# Patient Record
Sex: FEMALE | URBAN - METROPOLITAN AREA
[De-identification: names, ages, dates, MRNs, and addresses within clinical notes are randomized per-mention and may not be internally consistent; named-entity substitution may affect disease eponyms.]

---

## 2018-09-10 LAB
ABS NEUT (OLG): 7.39 X10(3)/MCL (ref 2.1–9.2)
ALBUMIN SERPL-MCNC: 4.4 GM/DL (ref 3.4–5)
ALBUMIN/GLOB SERPL: 1.4 {RATIO}
ALP SERPL-CCNC: 72 UNIT/L (ref 38–126)
ALT SERPL-CCNC: 15 UNIT/L (ref 12–78)
AST SERPL-CCNC: 9 UNIT/L (ref 15–37)
B-HCG SERPL QL: NEGATIVE
BASOPHILS # BLD AUTO: 0.1 X10(3)/MCL (ref 0–0.2)
BASOPHILS NFR BLD AUTO: 1 %
BILIRUB SERPL-MCNC: 0.3 MG/DL (ref 0.2–1)
BILIRUBIN DIRECT+TOT PNL SERPL-MCNC: 0.1 MG/DL (ref 0–0.2)
BILIRUBIN DIRECT+TOT PNL SERPL-MCNC: 0.2 MG/DL (ref 0–0.8)
BUN SERPL-MCNC: 19 MG/DL (ref 7–18)
CALCIUM SERPL-MCNC: 9 MG/DL (ref 8.5–10.1)
CHLORIDE SERPL-SCNC: 106 MMOL/L (ref 98–107)
CO2 SERPL-SCNC: 23 MMOL/L (ref 21–32)
CREAT SERPL-MCNC: 0.77 MG/DL (ref 0.55–1.02)
EOSINOPHIL # BLD AUTO: 0.2 X10(3)/MCL (ref 0–0.9)
EOSINOPHIL NFR BLD AUTO: 2 %
ERYTHROCYTE [DISTWIDTH] IN BLOOD BY AUTOMATED COUNT: 12.8 % (ref 11.5–17)
GLOBULIN SER-MCNC: 3.1 GM/DL (ref 2.4–3.5)
GLUCOSE SERPL-MCNC: 80 MG/DL (ref 74–106)
HCT VFR BLD AUTO: 41.4 % (ref 37–47)
HGB BLD-MCNC: 13.9 GM/DL (ref 12–16)
LYMPHOCYTES # BLD AUTO: 1.9 X10(3)/MCL (ref 0.6–4.6)
LYMPHOCYTES NFR BLD AUTO: 18 %
MCH RBC QN AUTO: 31.4 PG (ref 27–31)
MCHC RBC AUTO-ENTMCNC: 33.6 GM/DL (ref 33–36)
MCV RBC AUTO: 93.5 FL (ref 80–94)
MONOCYTES # BLD AUTO: 1 X10(3)/MCL (ref 0.1–1.3)
MONOCYTES NFR BLD AUTO: 9 %
NEUTROPHILS # BLD AUTO: 7.39 X10(3)/MCL (ref 1.4–7.9)
NEUTROPHILS NFR BLD AUTO: 70 %
PLATELET # BLD AUTO: 342 X10(3)/MCL (ref 130–400)
PMV BLD AUTO: 10.7 FL (ref 9.4–12.4)
POTASSIUM SERPL-SCNC: 3.9 MMOL/L (ref 3.5–5.1)
PROT SERPL-MCNC: 7.5 GM/DL (ref 6.4–8.2)
RBC # BLD AUTO: 4.43 X10(6)/MCL (ref 4.2–5.4)
SODIUM SERPL-SCNC: 141 MMOL/L (ref 136–145)
WBC # SPEC AUTO: 10.6 X10(3)/MCL (ref 4.5–11.5)

## 2018-09-11 ENCOUNTER — HISTORICAL (OUTPATIENT)
Dept: ADMINISTRATIVE | Facility: HOSPITAL | Age: 43
End: 2018-09-11

## 2022-04-29 NOTE — DISCHARGE SUMMARY
Patient:   Megan Olivarez            MRN: [id number]            FIN: [id number]               Age:   42 years     Sex:  Female     :  1975   Associated Diagnoses:   None   Author:   Micki Lawson MD      Discharge Information   Discharge Summary Information     Admitted  2018.     Discharged  2018.        Hospital Course   Pt is a 42 y.o. WF,  two, para two, pt admitted for laparoscopic tubal cauterization and dilation   and curettage with endometrial ablation due to heavy, irregular cycles, pt tolerated the procedures well.      Discharge Plan   Discharge Summary Plan   Discharge Status: stable.     Discharge Disposition: discharge to home.     Prescriptions: written and given to patient.

## 2022-04-29 NOTE — OP NOTE
Patient:   Megan Olivarez            MRN: [id number]            FIN: [id number]               Age:   42 years     Sex:  Female     :  1975   Associated Diagnoses:   Ovarian cyst   Author:   Micki Lawson MD      Operative Note   Operative Information   Procedures Performed: laparoscopic tubal cauterization, dilation and curettage with Ella hydrotherapy ablation.     Preoperative Diagnosis: Encounter for sterilization (UCA29-LO Z30.2), Menorrhagia with irregular cycle (XTI67-IJ N92.1), Ovarian cyst (UQE09-EF N83.209).     Postoperative Diagnosis: same as pre-op.     Surgeon: Micki Lawson MD.     Assistant.     Anesthesia: general.     Speciman Removed: endometrial scrapings.     Description of Procedure/Findings/    Complications: After operative consent had been obtained the pt was taken in to the operating room and placed in a supine position, she underwent smooth induction of general endotracheal anesthesia, she was then placed in a dorsal lithotomy position, prepped and draped in a sterile manner and an in and out cath was done using sterile technique with return of clear urine, examination under anesthesia revealed her uterus to be of normal size, station and position, no adnexal masses were palpated, a weighted speculum was placed in the vagina and the anterior lip of the cervix was grasped with a single tooth tenaculum, the uterus was sounded to 8 cm, dilation was carried out to a #8 Anayeli dilator, curettage was carried out until the normal gritty texture of the basal layer of the endometrium was reached, at this time the Ella device was placed, a hysteroscope was performed, ablation was then carried out for approx. 10 minutes, the device was then removed and  an acorn manipulator was placed in to the cervix; at this time a 1 cm infraumbilical skin incision was made with the scalpel, the verres needle was placed atraumatically in the peritoneal cavity, the abdomen was insulflated  with 2 liters of carbon dioxide, the primary trocar was placed and the trocar removed and the laparoscope placed in to the primary sleeve, a second 1 cm skin incision was made in the suprapubic area, the secondary trocar was placed, the trocar removed and a manipulator placed in to the secondary sleeve; upon inspection of the pt's pelvis, uterus was noted to be of normal color and texture, right ovary and fallopian tube was noted to be normal, the left ovary was enlarged with a benign appearing cyst approx. 3 cm, , the right fallopian tube was grasped with the CareTreeppinger forceps and cauterized in 3 continuous portions, the left fallopian tube was treated in a likewise fashion, the left ovarian cyst was drained, a small adhesion was lysed from the anterior abdominal wall to the left colon,  the forceps and the secondary sleeve were removed under direct visualization,  the laparoscope was removed,  the pt's abdomen was emptyed of carbon dioxide, the primary sleeve was removed, the skin incisions were closed with 4--0 PDS  in a subcuticular stitch, dermabond was  applied, the acorn manipulator was  removed, the single tooth tenaculum was removed, Monsel's was placed,  hemostasis was noted to be good, pt was taken down from the dorsal lithotomy position and replaced in to the supine postion, she was then awakened and extubated in the operating room and taken to the  recovery room in stable condition..     Esimated blood loss: loss  50  cc.     Complications: None.